# Patient Record
Sex: MALE | Race: WHITE | Employment: FULL TIME | URBAN - METROPOLITAN AREA
[De-identification: names, ages, dates, MRNs, and addresses within clinical notes are randomized per-mention and may not be internally consistent; named-entity substitution may affect disease eponyms.]

---

## 2024-09-28 ENCOUNTER — HOSPITAL ENCOUNTER (EMERGENCY)
Age: 34
Discharge: HOME | End: 2024-09-28
Attending: EMERGENCY MEDICINE
Payer: COMMERCIAL

## 2024-09-28 VITALS
SYSTOLIC BLOOD PRESSURE: 131 MMHG | BODY MASS INDEX: 24.92 KG/M2 | HEIGHT: 71 IN | RESPIRATION RATE: 16 BRPM | TEMPERATURE: 98.2 F | OXYGEN SATURATION: 98 % | DIASTOLIC BLOOD PRESSURE: 69 MMHG | HEART RATE: 73 BPM | WEIGHT: 178 LBS

## 2024-09-28 DIAGNOSIS — L72.9 CYST OF SKIN: Primary | ICD-10-CM

## 2024-09-28 PROCEDURE — 99283 EMERGENCY DEPT VISIT LOW MDM: CPT

## 2024-09-28 RX ORDER — CEPHALEXIN 500 MG/1
500 CAPSULE ORAL 4 TIMES DAILY
Qty: 28 CAPSULE | Refills: 0 | Status: SHIPPED | OUTPATIENT
Start: 2024-09-28 | End: 2024-10-05

## 2024-09-28 RX ORDER — IBUPROFEN 600 MG/1
600 TABLET, FILM COATED ORAL 4 TIMES DAILY PRN
Qty: 40 TABLET | Refills: 0 | Status: SHIPPED | OUTPATIENT
Start: 2024-09-28

## 2024-09-28 ASSESSMENT — PAIN SCALES - GENERAL: PAINLEVEL_OUTOF10: 7

## 2024-09-28 ASSESSMENT — LIFESTYLE VARIABLES
HOW OFTEN DO YOU HAVE A DRINK CONTAINING ALCOHOL: NEVER
HOW MANY STANDARD DRINKS CONTAINING ALCOHOL DO YOU HAVE ON A TYPICAL DAY: PATIENT DOES NOT DRINK

## 2024-09-28 ASSESSMENT — PAIN DESCRIPTION - LOCATION: LOCATION: BACK

## 2024-09-28 ASSESSMENT — PAIN - FUNCTIONAL ASSESSMENT: PAIN_FUNCTIONAL_ASSESSMENT: 0-10

## 2024-09-28 NOTE — ED PROVIDER NOTES
Medications   Medication Sig Dispense Refill    cephALEXin (KEFLEX) 500 MG capsule Take 1 capsule by mouth 4 times daily for 7 days 28 capsule 0    ibuprofen (ADVIL;MOTRIN) 600 MG tablet Take 1 tablet by mouth 4 times daily as needed for Pain 40 tablet 0    pantoprazole (PROTONIX) 40 MG tablet Take 40 mg by mouth daily.      doxycycline (VIBRA-TABS) 100 MG tablet Take 100 mg by mouth 2 times daily.      PREDNISONE, GRAZYNA, PO Take  by mouth.       No Known Allergies      PHYSICAL EXAM  ED Triage Vitals [09/28/24 1121]   BP Systolic BP Percentile Diastolic BP Percentile Temp Temp Source Pulse Respirations SpO2   129/80 -- -- 98.2 °F (36.8 °C) Oral 73 16 98 %      Height Weight - Scale         1.803 m (5' 11\") 80.7 kg (178 lb)           General appearance: Awake and alert. Cooperative. No acute distress.  HEENT: Normocephalic. Atraumatic. Mucous membranes are moist.  Neck: Supple.    Heart/Chest: RRR. No murmurs.    Vascular: symmetric/intact radial and DP pulses bilaterally  Lungs: Respirations unlabored. CTAB. Good air exchange. Speaking comfortably in full sentences.   Abdomen: Soft. Non-tender. Non-distended. No rebound or guarding.   Skin: Warm and dry. No acute rashes.  Approximately 2 x 2 cm firm mobile subcutaneous nodule with minimal overlying erythema over mid lumbar region, no crepitus, no fluctuance, no drainage or wound  Neurological: Alert and oriented. CN II-XII intact. Strength 5/5 bilateral upper and lower extremities. Sensation intact to light touch. Gait normal.    LABS  I have reviewed all labs for this visit.   No results found for this visit on 09/28/24.      RADIOLOGY  I have reviewed all radiographic studies for this visit.   No orders to display          ED COURSE/MDM    34 y.o. male presents with a cyst on his lower back.  I performed a point-of-care bedside ultrasound.  He did not have drainable fluid collection associated with this cyst.  I suspect epidermal/dermoid cyst versus sebaceous cyst.

## 2024-10-01 ENCOUNTER — OFFICE VISIT (OUTPATIENT)
Age: 34
End: 2024-10-01
Payer: COMMERCIAL

## 2024-10-01 VITALS
TEMPERATURE: 97.9 F | WEIGHT: 178 LBS | OXYGEN SATURATION: 98 % | HEART RATE: 67 BPM | BODY MASS INDEX: 24.92 KG/M2 | HEIGHT: 71 IN | SYSTOLIC BLOOD PRESSURE: 149 MMHG | DIASTOLIC BLOOD PRESSURE: 77 MMHG

## 2024-10-01 DIAGNOSIS — L72.3 INFECTED SEBACEOUS CYST: Primary | ICD-10-CM

## 2024-10-01 DIAGNOSIS — L08.9 INFECTED SEBACEOUS CYST: Primary | ICD-10-CM

## 2024-10-01 PROCEDURE — 96372 THER/PROPH/DIAG INJ SC/IM: CPT | Performed by: SURGERY

## 2024-10-01 PROCEDURE — 10060 I&D ABSCESS SIMPLE/SINGLE: CPT | Performed by: SURGERY

## 2024-10-01 RX ORDER — LIDOCAINE HYDROCHLORIDE 10 MG/ML
5 INJECTION, SOLUTION INFILTRATION; PERINEURAL ONCE
Status: COMPLETED | OUTPATIENT
Start: 2024-10-01 | End: 2024-10-01

## 2024-10-01 RX ADMIN — LIDOCAINE HYDROCHLORIDE 4 ML: 10 INJECTION, SOLUTION INFILTRATION; PERINEURAL at 09:30

## 2024-10-01 ASSESSMENT — ENCOUNTER SYMPTOMS: COLOR CHANGE: 1

## 2024-10-01 NOTE — PROGRESS NOTES
Subjective   Patient ID: Antoine Gudino is a 34 y.o. male.    HPI  Chief Complaint   Patient presents with    New Patient     Pt states that he is here for cyst on back that needs to be cut out      Patient referred by ED for evaluation of cyst. Patient reports symptoms of pain, swelling, drainage. Location of symptoms is low back. Symptoms were first noted years ago but worsened over the last week.   Alleviated by small amount of spontaneous drainage last evening and this morning.  Symptoms aggravated by palpation of the area, has difficulty sitting in a chair.  Previous evaluation includes exam by ED and started on Keflex. Patient has no significant medical history. Will plan following treatment: I&D.    Incision and drainage done in office today after consent obtained.  Under sterile conditions, 4 mL 1 % lidocaine was infused and 1.5 cm incision made over point of fluctuance located low mid back. Purulence and caseous material was drained and the wound packed with gauze. Dressing applied. No immediate complications. Patient instructed to remove packing in AM then cover with dry dressing.      Past Medical History:   Diagnosis Date    IBS (irritable bowel syndrome)     Thyroid disease 2009    goiter    Ulcer     as child       Past Surgical History:   Procedure Laterality Date    ADENOIDECTOMY      as child    APPENDECTOMY  2008    THYROIDECTOMY, PARTIAL  2008    gopiter removed    TYMPANOSTOMY TUBE PLACEMENT      multi[ple as child       Current Outpatient Medications   Medication Sig Dispense Refill    cephALEXin (KEFLEX) 500 MG capsule Take 1 capsule by mouth 4 times daily for 7 days 28 capsule 0    ibuprofen (ADVIL;MOTRIN) 600 MG tablet Take 1 tablet by mouth 4 times daily as needed for Pain 40 tablet 0    pantoprazole (PROTONIX) 40 MG tablet Take 1 tablet by mouth daily      doxycycline (VIBRA-TABS) 100 MG tablet Take 100 mg by mouth 2 times daily. (Patient not taking: Reported on 10/1/2024)      PREDNISONE,

## 2024-10-30 ENCOUNTER — PROCEDURE VISIT (OUTPATIENT)
Age: 34
End: 2024-10-30
Payer: COMMERCIAL

## 2024-10-30 VITALS
BODY MASS INDEX: 24.92 KG/M2 | HEART RATE: 85 BPM | SYSTOLIC BLOOD PRESSURE: 127 MMHG | HEIGHT: 71 IN | WEIGHT: 178 LBS | DIASTOLIC BLOOD PRESSURE: 85 MMHG

## 2024-10-30 DIAGNOSIS — L08.9 INFECTED SEBACEOUS CYST: Primary | ICD-10-CM

## 2024-10-30 DIAGNOSIS — L72.3 INFECTED SEBACEOUS CYST: Primary | ICD-10-CM

## 2024-10-30 PROCEDURE — 11401 EXC TR-EXT B9+MARG 0.6-1 CM: CPT | Performed by: SURGERY

## 2024-10-30 RX ORDER — LIDOCAINE HYDROCHLORIDE 10 MG/ML
5 INJECTION, SOLUTION INFILTRATION; PERINEURAL ONCE
Status: COMPLETED | OUTPATIENT
Start: 2024-10-30 | End: 2024-10-30

## 2024-10-30 RX ADMIN — LIDOCAINE HYDROCHLORIDE 2 ML: 10 INJECTION, SOLUTION INFILTRATION; PERINEURAL at 15:49

## 2024-10-30 NOTE — PROGRESS NOTES
Subjective   Patient ID: Antione Gudino is a 34 y.o. male.    HPI  CC: cyst excision  Here for definitive cyst excision after drainage of infected cyst 4 weeks ago    Excision of lesion done in office today after consent obtained.  Under sterile conditions, 5 ml of 1 % lidocaine was infused.  A 2.5 cm by 1 centimeters elliptical incision made around 1 cm cyst located back and the lesion removed. Hemostasis was obtained with direct pressure and wound closed with 4-0 nylons. No immediate complications noted. Dressing placed. No specimen sent.    Review of Systems       Objective   Physical Exam   1 cm residual cyst low mid back without erythema    Assessment    Diagnosis Orders   1. Infected sebaceous cyst  lidocaine 1 % injection 5 mL    EXC SKIN BENIG 0.6-1CM TRUNK,ARM,LEG             Plan   Tolerate excision well  Remove dressing 48 hours  Okay to shower, keep clean and dry  Follow-up 2 weeks for suture removal        Yonathan Wills MD

## 2024-11-13 ENCOUNTER — OFFICE VISIT (OUTPATIENT)
Age: 34
End: 2024-11-13

## 2024-11-13 VITALS
HEIGHT: 71 IN | WEIGHT: 178 LBS | HEART RATE: 69 BPM | BODY MASS INDEX: 24.92 KG/M2 | OXYGEN SATURATION: 100 % | DIASTOLIC BLOOD PRESSURE: 83 MMHG | SYSTOLIC BLOOD PRESSURE: 125 MMHG

## 2024-11-13 DIAGNOSIS — L08.9 INFECTED SEBACEOUS CYST: Primary | ICD-10-CM

## 2024-11-13 DIAGNOSIS — L72.3 INFECTED SEBACEOUS CYST: Primary | ICD-10-CM

## 2024-11-13 PROCEDURE — 99024 POSTOP FOLLOW-UP VISIT: CPT | Performed by: SURGERY

## 2024-11-13 NOTE — PROGRESS NOTES
Subjective   Patient ID: Antoine Gudino is a 34 y.o. male.    HPI  Status post excision of previously inflamed sebaceous cyst lower mid back 2 weeks ago.  No complaints.  Denies pain or drainage  Review of Systems       Objective   Physical Exam   Wound healed    Assessment    Diagnosis Orders   1. Infected sebaceous cyst               Plan   Sutures removed  No restrictions  Return as needed        Yonathan Wills MD